# Patient Record
(demographics unavailable — no encounter records)

---

## 2024-11-01 NOTE — REVIEW OF SYSTEMS
[Anxiety] : anxiety [Joint Pain] : joint pain [Negative] : Heme/Lymph [FreeTextEntry9] : Muscle pain

## 2024-11-01 NOTE — CONSULT LETTER
[Dear  ___] : Dear  [unfilled], [Courtesy Letter:] : I had the pleasure of seeing your patient, [unfilled], in my office today. [Sincerely,] : Sincerely, [FreeTextEntry2] : [place]  [address] [place] [id number],  [place] [phone]  [FreeTextEntry1] : Kami is a very pleasant 64-year-old female patient who was seen in our office today regarding bilateral lower extremity symptoms.  The patient was accompanied by a friend at today's visit.  The patient endorses an approximate 8-month history of radiating pain down the length of her legs.  The patient endorses mild intermittent numbness and tingling in the lower extremities as well.  The patient denies any new bowel or bladder symptoms.  The patient denies any difficulty walking other than secondary to pain.  The patient does not recall any specific inciting event leading up to this pain and the pain has been progressively worsening.  The patient underwent several investigations through a rheumatologist and vascular surgeon which did not reveal a specific cause for the patient's radiating pain.  The patient was taking oral steroids through her rheumatologist which did help significantly with her pain.  The patient also obtained epidural injections which provided relief lasting weeks.  The patient has not yet attempted physical therapy.  The patient endorses diabetes, hypothyroidism, and anxiety as part of her medical history.  The patient's current medical regimen includes methimazole, Crestor, Xanax, gabapentin, meloxicam, and Geodon.  The patient denies allergies to medications.  On examination, the patient is alert, oriented, and compliant with the exam.  The patient demonstrates full strength in the lower extremities bilaterally.  The patient demonstrates muted reflexes in the lower extremities rated at 2+ at the patellar tendons and 1+ at the Achilles tendons bilaterally.  The patient does not have a Ai sign or ankle clonus.  The patient ambulates with a slightly antalgic gait.  The patient stands with a stooped posture.  The patient is accompanied with an MRI scan of the lumbar spine dated April 19, 2024.  These images demonstrate moderate degenerative changes most notably at L5/S1 where there is near complete disc desiccation.  However, there is no nerve root or cauda equina compression at this level.  At L4/5, there is evidence of lateral recess stenosis secondary to a disc bulge slightly eccentric to the right possibly causing L5 nerve root impingement bilaterally.  Foraminal stenosis is noted bilaterally without ongoing nerve root compression.  Taken together, the patient has a clinical history and radiographic findings possibly consistent with neurogenic claudication or intermittent radiculopathies.  The patient has responded well to epidural injections but unfortunately the symptoms remain persistent.  The patient has also been worked up by vascular surgery which did not provide reveal any evidence of severe peripheral vascular disease.  As such, the patient's lower extremity symptoms are likely a result of either muscle disuse or a neurogenic cause secondary to her lumbar stenosis at L4/5.  The patient has been offered physical therapy to better define the cause of her symptoms at today's visit.  We also discussed the role of surgical intervention in the form of an L4/5 bilateral lumbar decompression via unilateral approach to help with her stenosis.  The potential benefits and risks of the procedure were explained in detail to the patient and her .  At this time, the patient will be returning home to consider her options and will be following up with our office on an as-needed basis. [FreeTextEntry3] : Jessee Mancia MD, PhD, FRCPSC   Attending Neurosurgeon  [address], [address]nd [place] [address]  Office: [phone]  Fax: [phone]

## 2025-02-03 NOTE — HISTORY OF PRESENT ILLNESS
[de-identified] : Ms. Lin is a 60 year old female with history of antiphospholipid antibody syndrome and history of a TIA diagnosed in 2007.  She had lost vision in the left eye transiently and had extensive work up and saw multiple specialists at that time.  She has had this transient loss of vision several times since then and has an aura preceding the event.  She was eventually diagnosed with ocular migraine.  She also reports that her antiphospholipid antibodies were borderline at that time and saw a rheumatologist for about a year without a clear diagnosis. She has been on Plavix and ASA since then for possible TIA. \par  \par  She also has a history of erythrocytosis and had intermittent phlebotomy every 3 months in [id number] at [facility].  She tested for negative JAK2 V617F mutation in 2011.\par  She subsequently saw Dr. Linda Yoon at [facility] in [facility] and had a comprehensive evaluation.  She had a bone marrow biopsy which was normal with no evidence of an MPN. \par  \par  4/11/2012 Bone marrow biopsy for JAK2 negative MPD -  normal cellular marrow with maturing trilineage hematopoiesis. No evidence of MPN. Normal cytogenetics. \par  \par  She has no history of blood clots. No history of CVA or or blood clots in the family.  No HRT. No tobacco use. She reports a history of obesity. \par  \par  No history of fibroids  \par  Social alcohol use [de-identified] : Returns for follow up.   Diagnosed with spinal stenosis - scheduled for lumbar spinal decompression with Dr. Mancia. This is anticipated for a same day surgery.  On ozempic - being switched to Mounjaro. Saw rheum - was taken off Leflunomide, no longer has RA.

## 2025-02-03 NOTE — ASSESSMENT
[FreeTextEntry1] : 64 year old female diagnosed with antiphospholipid antibody syndrome in 2007 after a transient loss of vision in left eye which was eventually attributed to ocular migraine. Reportedly her APL antibodies were borderline at that time. She has been on ASA + Plavix since then. She has since had multiple similar episodes ocular migraine with preceding aura. Testing for APL showed Beta 2 glycoprotein negative, SHAHLA positive but IgM and IgG levels of anticardiolipin negative. Lupus anticoagulant negative. Factor V Leiden negative.   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 -labs reviewed: baseline HCT 46-52 range -sleep study 12/13/21 - no significant apnea. -5/14/21 JAK2 exon 12-13 negative, CALR negative, MPL negative -s/p bone marrow biopsy on 11/24/21 with no evidence of MPN, myeloid gene panel negative -12/20/2021 CT Abd/Pelvis: ALEXIA -likely has reactive polycythemia given negative BM biopsy, and negative mutational analysis. Plus no evidence of malignancy on CT abd/pelvis. -H/H today is 15 / 47% -continue observation  2)Macrocytosis -MCV range from 102-107 -possibly due to h/o fatty liver disease -  Pending lumbar decompression surgery with Dr. Mancia. Planned same day surgery - expected to be ambulatory same day. Advised early aggressive ambulation.  She may proceed from hematology standpoint.   RTO 1 year

## 2025-03-03 NOTE — REVIEW OF SYSTEMS
[Fatigue] : fatigue [Joint Pain] : joint pain [Joint Swelling] : joint swelling [Muscle Pain] : muscle pain [Back Pain] : back pain [Negative] : Heme/Lymph [Fever] : no fever [Chills] : no chills [Hot Flashes] : no hot flashes [Night Sweats] : no night sweats [Recent Change In Weight] : ~T no recent weight change [Joint Stiffness] : no joint stiffness [Muscle Weakness] : no muscle weakness

## 2025-03-03 NOTE — PLAN
[FreeTextEntry1] : 64 YOF with PMHx of Grave's Disease, HLD, ASHWINI, RA, polycythemia, antiphospholipid antibody syndrome, TIA (2007), prediabetes presents for a presurgical evaluation. Patient states she is doing well and denies any complaints. Patient denies recent illnesses or hospitalizations. Patient denies fever, chills, chest pain, SOB, abdominal pain, history of blood clot. Patient sees Dr. De La O for hematology and was cleared for procedure from their perspective.    PATIENT MEDICALLY CLEARED FOR PROCEDURE

## 2025-03-03 NOTE — HISTORY OF PRESENT ILLNESS
[No Pertinent Cardiac History] : no history of aortic stenosis, atrial fibrillation, coronary artery disease, recent myocardial infarction, or implantable device/pacemaker [Sleep Apnea] : sleep apnea [No Adverse Anesthesia Reaction] : no adverse anesthesia reaction in self or family member [Diabetes] : diabetes [(Patient denies any chest pain, claudication, dyspnea on exertion, orthopnea, palpitations or syncope)] : Patient denies any chest pain, claudication, dyspnea on exertion, orthopnea, palpitations or syncope [Aortic Stenosis] : no aortic stenosis [Atrial Fibrillation] : no atrial fibrillation [Coronary Artery Disease] : no coronary artery disease [Recent Myocardial Infarction] : no recent myocardial infarction [Implantable Device/Pacemaker] : no implantable device/pacemaker [Asthma] : no asthma [COPD] : no COPD [Smoker] : not a smoker [Chronic Anticoagulation] : no chronic anticoagulation [Chronic Kidney Disease] : no chronic kidney disease [FreeTextEntry1] : Lumbar spinal decompression [FreeTextEntry2] : 2/28/25 [FreeTextEntry3] : Dr. Mancia [FreeTextEntry4] : 64 YOF with PMHx of Grave's Disease, HLD, ASHWINI, RA, polycythemia, antiphospholipid antibody syndrome, TIA (2007), prediabetes presents for a presurgical evaluation. Patient states she is doing well and denies any complaints. Patient denies recent illnesses or hospitalizations. Patient denies fever, chills, chest pain, SOB, abdominal pain, history of blood clot. Patient sees Dr. De La O for hematology and was cleared for procedure from their perspective.  [FreeTextEntry7] : Patient cardiology clearance in separate provider note.

## 2025-03-03 NOTE — ASSESSMENT
[Patient Optimized for Surgery] : Patient optimized for surgery [FreeTextEntry4] : 64 YOF with PMHx of Grave's Disease, HLD, ASHWINI, RA, polycythemia, antiphospholipid antibody syndrome, TIA (2007), prediabetes presents for a presurgical evaluation. Patient states she is doing well and denies any complaints. Patient denies recent illnesses or hospitalizations. Patient denies fever, chills, chest pain, SOB, abdominal pain, history of blood clot. Patient sees Dr. De La O for hematology and was cleared for procedure from their perspective.    PATIENT MEDICALLY CLEARED FOR PROCEDURE

## 2025-03-17 NOTE — CONSULT LETTER
[Dear  ___] : Dear  [unfilled], [Courtesy Letter:] : I had the pleasure of seeing your patient, [unfilled], in my office today. [Sincerely,] : Sincerely, [FreeTextEntry1] : Kami Lin is a 64-year-old female who presents today for postop evaluation.  On 2/28/2025 patient underwent L4/5 decompression for stenosis, neurogenic claudication and bilateral radiculopathies.  Patient denies any lower back pain.  She reports a stiffness sensation to bilateral legs.  She reports a sensation of fatigue and weakness to bilateral legs however she reports this has improved.  She reports she is able to stand for longer period of time.  She denies lower extremity numbness or tingling.  Denies bowel pr bladder dysfunction or saddle anesthesia.  Patient denies any incisional redness, drainage, or swelling.  Denies fever or chills.  There is no imaging to review with the patient.  Patient is alert and oriented.  No distress noted.  Incision without any redness, drainage, swelling.  No fever or chills noted.  Steri-Strips removed without difficulty.  Patient ambulates with antalgic gait.  Negative clonus.  Strength of bilateral legs 5/5.  Lower extremity reflexes 1+.  At this time we will follow-up in approximately 2 weeks to evaluate for ongoing progression.  Patient advised to avoid any bending lifting or twisting. Patient aware signs and symptoms are consistent with infection.  Patient aware to call with any further questions or concerns or with any new or worsening symptoms. [FreeTextEntry3] :  Shannon Bang, MSN, [facility]-BC Nurse Practitioner Neurosurgery [address] [facility], [address]nd [place] [address] Office: [phone] Fax: [phone]

## 2025-03-17 NOTE — CONSULT LETTER
[Dear  ___] : Dear  [unfilled], [Courtesy Letter:] : I had the pleasure of seeing your patient, [unfilled], in my office today. [Sincerely,] : Sincerely, [FreeTextEntry1] : Kami Lin is a 64-year-old female who presents today for postop evaluation.  On 2/28/2025 patient underwent L4/5 decompression for stenosis, neurogenic claudication and bilateral radiculopathies.  Patient denies any lower back pain.  She reports a stiffness sensation to bilateral legs.  She reports a sensation of fatigue and weakness to bilateral legs however she reports this has improved.  She reports she is able to stand for longer period of time.  She denies lower extremity numbness or tingling.  Denies bowel pr bladder dysfunction or saddle anesthesia.  Patient denies any incisional redness, drainage, or swelling.  Denies fever or chills.  There is no imaging to review with the patient.  Patient is alert and oriented.  No distress noted.  Incision without any redness, drainage, swelling.  No fever or chills noted.  Steri-Strips removed without difficulty.  Patient ambulates with antalgic gait.  Negative clonus.  Strength of bilateral legs 5/5.  Lower extremity reflexes 1+.  At this time we will follow-up in approximately 2 weeks to evaluate for ongoing progression.  Patient advised to avoid any bending lifting or twisting. Patient aware signs and symptoms are consistent with infection.  Patient aware to call with any further questions or concerns or with any new or worsening symptoms. [FreeTextEntry3] :  Shannon Bang, MSN, [facility]-BC Nurse Practitioner Neurosurgery [address], [address]nd [place] [address] Office: [phone] Fax: [phone]

## 2025-03-24 NOTE — REVIEW OF SYSTEMS
[Negative] : Heme/Lymph [de-identified] : hair loss, wound on right knee  [de-identified] : lightheadedness

## 2025-03-24 NOTE — HEALTH RISK ASSESSMENT
[No] : In the past 12 months have you used drugs other than those required for medical reasons? No [No falls in past year] : Patient reported no falls in the past year [0] : 2) Feeling down, depressed, or hopeless: Not at all (0) [PHQ-2 Negative - No further assessment needed] : PHQ-2 Negative - No further assessment needed [[id number]] : 0 [Patient/Caregiver not ready to engage] : , patient/caregiver not ready to engage [I will adhere to the patient's wishes.] : I will adhere to the patient's wishes. [Former] : Former [5-9] : 5-9

## 2025-03-24 NOTE — HEALTH RISK ASSESSMENT
[No] : In the past 12 months have you used drugs other than those required for medical reasons? No [No falls in past year] : Patient reported no falls in the past year [0] : 2) Feeling down, depressed, or hopeless: Not at all (0) [PHQ-2 Negative - No further assessment needed] : PHQ-2 Negative - No further assessment needed [[id number]] : 0 [Patient/Caregiver not ready to engage] : , patient/caregiver not ready to engage [I will adhere to the patient's wishes.] : I will adhere to the patient's wishes. [Former] : Former [5-9] : 5-9

## 2025-03-24 NOTE — COUNSELING
[Engage in a relaxing activity] : Engage in a relaxing activity [Encouraged to maintain food diary] : Encouraged to maintain food diary [Encouraged to increase physical activity] : Encouraged to increase physical activity [Encouraged to use exercise tracking device] : Encouraged to use exercise tracking device

## 2025-03-24 NOTE — HISTORY OF PRESENT ILLNESS
[FreeTextEntry1] : 64 year old female with PMHx of Graves disease, reactive polycythemia, prediabetes, vertigo, HLD, ocular migraines, ASHWINI on CPAP presents for a follow up. [de-identified] : 64 year old female with PMHx of Graves disease, reactive polycythemia, prediabetes, vertigo, HLD, ocular migraines, ASHWINI on CPAP presents for a follow up. Patient underwent a lumbar spinal decompression on 2/28/25? Pt denies fever, chills, headaches, chest pain, SOB, abdominal pain, N/V/D.

## 2025-03-24 NOTE — HISTORY OF PRESENT ILLNESS
[FreeTextEntry1] : 64 year old female with PMHx of Graves disease, reactive polycythemia, prediabetes, vertigo, HLD, ocular migraines, ASHWINI on CPAP presents for a follow up. [de-identified] : 64 year old female with PMHx of Graves disease, reactive polycythemia, prediabetes, vertigo, HLD, ocular migraines, ASHWINI on CPAP presents for a follow up. Patient underwent a lumbar spinal decompression on 2/28/25? Pt denies fever, chills, headaches, chest pain, SOB, abdominal pain, N/V/D.

## 2025-03-24 NOTE — REVIEW OF SYSTEMS
[Negative] : Heme/Lymph [de-identified] : hair loss, wound on right knee  [de-identified] : lightheadedness

## 2025-03-24 NOTE — PLAN
[FreeTextEntry1] : 64 year old female with PMHx of Graves disease, reactive polycythemia, prediabetes, vertigo, HLD, ocular migraines, ASHWINI on CPAP presents for a follow up.  - plan of care reviewed - medications reviewed - labs drawn - RTC in

## 2025-03-24 NOTE — HEALTH RISK ASSESSMENT
[No] : In the past 12 months have you used drugs other than those required for medical reasons? No [No falls in past year] : Patient reported no falls in the past year [0] : 2) Feeling down, depressed, or hopeless: Not at all (0) [PHQ-2 Negative - No further assessment needed] : PHQ-2 Negative - No further assessment needed [[id number]] : 0 [Patient/Caregiver not ready to engage] : , patient/caregiver not ready to engage [I will adhere to the patient's wishes.] : I will adhere to the patient's wishes. [Former] : Former [5-9] : 5-9

## 2025-03-24 NOTE — HISTORY OF PRESENT ILLNESS
[FreeTextEntry1] : 64 year old female with PMHx of Graves disease, reactive polycythemia, prediabetes, vertigo, HLD, ocular migraines, ASHWINI on CPAP presents for a follow up. [de-identified] : 64 year old female with PMHx of Graves disease, reactive polycythemia, prediabetes, vertigo, HLD, ocular migraines, ASHWINI on CPAP presents for a follow up. Patient underwent a lumbar spinal decompression on 2/28/25? Pt denies fever, chills, headaches, chest pain, SOB, abdominal pain, N/V/D.

## 2025-03-24 NOTE — REVIEW OF SYSTEMS
[Negative] : Heme/Lymph [de-identified] : hair loss, wound on right knee  [de-identified] : lightheadedness

## 2025-03-27 NOTE — CONSULT LETTER
[Dear  ___] : Dear  [unfilled], [Courtesy Letter:] : I had the pleasure of seeing your patient, [unfilled], in my office today. [Sincerely,] : Sincerely, [FreeTextEntry1] : Kami is a very pleasant 64-year-old female patient who was seen in our office today in approximate 4-week follow-up from a lumbar decompression for bilateral lower extremity symptoms.  I am happy to report that the patient has seen significant improvements since her surgical intervention.  However, the patient still endorses ongoing symptoms.  The patient states that she is now able to stand 30 to 45 minutes without needing to sit down whereas she was only able to do so for 5 minutes prior to surgery.  The patient endorses being able to take stairs without having to stop which is an improvement as well.  The patient continues to experience intermittent pain in the legs primarily after prolonged sitting.  The patient endorses pain when transitioning from a seated to standing position after sitting for a prolonged period of time.  The symptoms can last several minutes before resolving.  On examination, the patient remains alert, oriented, and compliant with the exam.  The patient demonstrates full strength in the lower extremities bilaterally.  The patient ambulates well.  The patient's incision is clean, dry, and intact.  I have no new imaging to review today.  Taken together, I am gratified to see the patient improving postsurgically.  However, I believe that the patient still experiences significant muscle pain.  I suspect that the patient has limited her activities significantly since her pain started several months ago.  At this time, I have recommended physical therapy with specific emphasis on lower extremity strengthening as well as endurance.  I have recommended follow-up with our office in approximately 4 to 6 weeks to reevaluate her progress and I look forward to seeing her back at that time.  The patient remains on gabapentin and I discussed the possibility of weaning her off gabapentin should her pain continue to improve. [FreeTextEntry3] :  Jessee Mancia MD, PhD, FRCPSC Attending Neurosurgeon [address] [place] [address] Office: [phone] Fax: [phone]

## 2025-04-25 NOTE — REVIEW OF SYSTEMS
[Negative] : Heme/Lymph [Joint Pain] : joint pain [Back Pain] : back pain [de-identified] : hair loss, wound on right knee  [de-identified] : lightheadedness [FreeTextEntry1] : Low Vit. D, DM

## 2025-04-25 NOTE — PHYSICAL EXAM
[No Acute Distress] : no acute distress [Well Nourished] : well nourished [Well Developed] : well developed [Well-Appearing] : well-appearing [Normal Sclera/Conjunctiva] : normal sclera/conjunctiva [PERRL] : pupils equal round and reactive to light [EOMI] : extraocular movements intact [Normal Outer Ear/Nose] : the outer ears and nose were normal in appearance [Normal Oropharynx] : the oropharynx was normal [No JVD] : no jugular venous distention [No Lymphadenopathy] : no lymphadenopathy [Supple] : supple [Thyroid Normal, No Nodules] : the thyroid was normal and there were no nodules present [No Respiratory Distress] : no respiratory distress  [No Accessory Muscle Use] : no accessory muscle use [Clear to Auscultation] : lungs were clear to auscultation bilaterally [Normal Rate] : normal rate  [Regular Rhythm] : with a regular rhythm [Normal S1, S2] : normal S1 and S2 [No Murmur] : no murmur heard [No Carotid Bruits] : no carotid bruits [No Abdominal Bruit] : a ~M bruit was not heard ~T in the abdomen [No Varicosities] : no varicosities [Pedal Pulses Present] : the pedal pulses are present [No Edema] : there was no peripheral edema [No Palpable Aorta] : no palpable aorta [No Extremity Clubbing/Cyanosis] : no extremity clubbing/cyanosis [Soft] : abdomen soft [Non Tender] : non-tender [Non-distended] : non-distended [No Masses] : no abdominal mass palpated [No HSM] : no HSM [Normal Bowel Sounds] : normal bowel sounds [Normal Posterior Cervical Nodes] : no posterior cervical lymphadenopathy [Normal Anterior Cervical Nodes] : no anterior cervical lymphadenopathy [No CVA Tenderness] : no CVA  tenderness [No Spinal Tenderness] : no spinal tenderness [No Joint Swelling] : no joint swelling [Grossly Normal Strength/Tone] : grossly normal strength/tone [No Rash] : no rash [Coordination Grossly Intact] : coordination grossly intact [No Focal Deficits] : no focal deficits [Normal Gait] : normal gait [Deep Tendon Reflexes (DTR)] : deep tendon reflexes were 2+ and symmetric [Normal Affect] : the affect was normal [Normal Insight/Judgement] : insight and judgment were intact [Normal] : Normal [Comprehensive Foot Exam Normal] : Right and left foot were examined and both feet are normal. No ulcers in either foot. Toes are normal and with full ROM.  Normal tactile sensation with monofilament testing throughout both feet [TextBox_2] : Yes [TextBox_4] : HS

## 2025-04-25 NOTE — HEALTH RISK ASSESSMENT
[No] : In the past 12 months have you used drugs other than those required for medical reasons? No [No falls in past year] : Patient reported no falls in the past year [0] : 2) Feeling down, depressed, or hopeless: Not at all (0) [PHQ-2 Negative - No further assessment needed] : PHQ-2 Negative - No further assessment needed [Patient/Caregiver not ready to engage] : , patient/caregiver not ready to engage [I will adhere to the patient's wishes.] : I will adhere to the patient's wishes. [Former] : Former [5-9] : 5-9 [1 or 2 (0 pts)] : 1 or 2 (0 points) [Never (0 pts)] : Never (0 points) [Time Spent: ___ minutes] : Time Spent: [unfilled] minutes [Audit-CScore] : 0 [de-identified] : Average [de-identified] : Average [[facility] [place]] : 7 [[id number]] : 0 [AdvancecareDate] : 04/25

## 2025-04-25 NOTE — COUNSELING
[Engage in a relaxing activity] : Engage in a relaxing activity [Encouraged to increase physical activity] : Encouraged to increase physical activity [Fall prevention counseling provided] : Fall prevention counseling provided [Adequate lighting] : Adequate lighting [No throw rugs] : No throw rugs [Use proper foot wear] : Use proper foot wear [Behavioral health counseling provided] : Behavioral health counseling provided [Sleep ___ hours/day] : Sleep [unfilled] hours/day [Plan in advance] : Plan in advance [AUDIT-C Screening administered and reviewed] : AUDIT-C Screening administered and reviewed [Potential consequences of obesity discussed] : Potential consequences of obesity discussed [Benefits of weight loss discussed] : Benefits of weight loss discussed [Weigh Self Weekly] : weigh self weekly [Decrease Portions] : decrease portions [None] : None [Good understanding] : Patient has a good understanding of lifestyle changes and steps needed to achieve self management goal [FreeTextEntry2] : Quit Smoking

## 2025-05-01 NOTE — CONSULT LETTER
[Dear  ___] : Dear  [unfilled], [Courtesy Letter:] : I had the pleasure of seeing your patient, [unfilled], in my office today. [Sincerely,] : Sincerely, [FreeTextEntry1] : Mrs. Lin is a very pleasant 64-year-old female patient was seen in our office today approximately 2 months following her surgical intervention.  I am happy to report that the patient is continuing to participate with physical therapy 2 times a week with significant improvements.  The patient is able to stand longer without needing to sit.  The patient is able to take stairs better.  The patient endorses episodic nerve pain in the legs which is also improving though the patient is currently taking 600 mg of gabapentin nightly.  On examination, patient is alert, oriented, and compliant with the exam.  The patient demonstrates full strength in the upper and lower extremities bilaterally.  The patient ambulates well.  The patient has a much easier time transitioning from a seated to standing position.  The patient's incision is clean, dry, and intact.  I am no new imaging to review today.  Taken together, I am gratified to see the patient improving after her surgery albeit slowly.  The patient has made considerable gains since the last time we met and I have encouraged the patient to continue with physical therapy in this regard.  Given the patient's good clinical trajectory, the patient will be following up with our office on an as-needed basis. [FreeTextEntry3] :  Jessee Mancia MD, PhD, FRCPSC Attending Neurosurgeon [address], [address] [place] [address] Office: [phone] Fax: [phone]

## 2025-06-23 NOTE — PHYSICAL EXAM
[TextEntry] : The left first MPJ is red, hot and swollen.  Ecchymosis is absent.  Pain upon light palpation of the area is present.

## 2025-06-23 NOTE — ASSESSMENT
[FreeTextEntry1] : Assessment: Rule out gout, left foot. Sesamoiditis, left foot.  Plan:   I had a long conversation with Kami about diagnosis and treatment options.  Due to her level of pain, I am concerned about gout or an acute inflammation.  Even treating if it was strictly the sesamoid, something like a Cam boot is not a great option for her because she just had back surgery.  Since she is taking the Medrol pack, I recommended that she bolster it with ibuprofen, which she stated she had at home.  I gave her an injection of 3 cc of 0.5% Marcaine plain to diminish the pain.  She is to limit her weightbearing and walking activities, ice and elevate the foot as much as possible.  I advised the patient to notify the office right away if increased redness, swelling, pain, open wounds or discharge were observed.  If she does not improve, she will return for another cortisone injection.

## 2025-06-23 NOTE — HISTORY OF PRESENT ILLNESS
[FreeTextEntry1] : Kami Lin returns after calling the office stating that the pain in her left foot is still severe.  She was here earlier in the week and saw Dr. Hernandez who diagnosed sesamoiditis and gave her a cortisone injection and a steroid pack and stated that the injection helped for a short term, but she was unable to fill the prescription for the Medrol pack because it was on a back order and she just started it yesterday.  She states that she has been taking it as directed.  She states that the pain is severe and the joint is swollen.

## 2025-07-02 NOTE — ASSESSMENT
[Carbohydrate Consistent Diet] : carbohydrate consistent diet [Exercise/Effect on Glucose] : exercise/effect on glucose [Weight Loss] : weight loss [FreeTextEntry1] :  Pt 65 yo with h/o hyperT due to Graves disease  treated with MMI . She also has new onset DM2 after treated with levuflomide for possible RA which was not confirmed. She had spinal stenosis.  getting  with    HyperT : due to Graves disease.  pt euthyroid clinically and biochemically. CONT MMI 10 mg BID.   DM2 : increase Mounjaro to 15 mg /week . A1c 6.5.  admits having a terrible diet habit due to  her  fiance and eating out and eating everything  discussed diet and exercise encouraged more exercise walking 30 min 3 x week Needs to try to have more protein for meals seeing a CDE  Medication, risks and benefits reviewed. check jordyn/c ratio  low B12 : cont supplements.  Low vit D defic: cont supplements.  cont ASA   HLD: LDL at target. Cont statin and fish oil.  low fat/low cholesterol diet and weight loss advised  Morbid obesity : she used to be > 300 lbs.  now on diet and diabetic diet material written provided  discussed diet and exercise encouraged more exercise walking 30 min 3 x week after finishing PT after spinals stenosis surgery  admits having addiction to food.   All lab results reviewed independently and discussed with patient with extensive discussion.  All questions answered Laboratory tests ordered today Continue other current medications

## 2025-07-02 NOTE — HISTORY OF PRESENT ILLNESS
[FreeTextEntry1] : quality: dm2 onset 2016 severity: moderate modifying factors: diet helps and exercise associated factors: HLD

## 2025-07-15 NOTE — HEALTH RISK ASSESSMENT
[No] : In the past 12 months have you used drugs other than those required for medical reasons? No [No falls in past year] : Patient reported no falls in the past year [0] : 2) Feeling down, depressed, or hopeless: Not at all (0) [PHQ-2 Negative - No further assessment needed] : PHQ-2 Negative - No further assessment needed [Patient/Caregiver not ready to engage] : , patient/caregiver not ready to engage [I will adhere to the patient's wishes.] : I will adhere to the patient's wishes. [Former] : Former [5-9] : 5-9 [[id number]] : 0

## 2025-07-15 NOTE — REVIEW OF SYSTEMS
[Negative] : Heme/Lymph [de-identified] : hair loss, wound on right knee  [de-identified] : lightheadedness

## 2025-07-15 NOTE — HISTORY OF PRESENT ILLNESS
[FreeTextEntry1] : 64 year old female with PMHx of Graves disease, reactive polycythemia, prediabetes, vertigo, HLD, ocular migraines, ASHWINI on CPAP presents for a follow up. [de-identified] : 64 year old female with PMHx of Graves disease, reactive polycythemia, prediabetes, vertigo, HLD, ocular migraines, ASHWINI on CPAP presents for a follow up. She has no acute complaints. She reports little to no change in appetite on the mounjaro.

## 2025-07-15 NOTE — PLAN
[FreeTextEntry1] : 64 year old female with PMHx of Graves disease, reactive polycythemia, prediabetes, vertigo, HLD, ocular migraines, ASHWINI on CPAP presents for a follow up. She has no acute complaints. She reports little to no change in appetite on the mounjaro.  - plan of care reviewed - medications reviewed - RTC in 3mo